# Patient Record
Sex: FEMALE | Race: BLACK OR AFRICAN AMERICAN | Employment: UNEMPLOYED | URBAN - METROPOLITAN AREA
[De-identification: names, ages, dates, MRNs, and addresses within clinical notes are randomized per-mention and may not be internally consistent; named-entity substitution may affect disease eponyms.]

---

## 2021-05-04 ENCOUNTER — HOSPITAL ENCOUNTER (OUTPATIENT)
Dept: LAB | Age: 56
Discharge: HOME | End: 2021-05-04
Payer: COMMERCIAL

## 2021-05-04 ENCOUNTER — OFFICE VISIT (OUTPATIENT)
Dept: FAMILY MEDICINE CLINIC | Age: 56
End: 2021-05-04
Payer: COMMERCIAL

## 2021-05-04 VITALS
DIASTOLIC BLOOD PRESSURE: 79 MMHG | BODY MASS INDEX: 28.94 KG/M2 | HEIGHT: 67 IN | OXYGEN SATURATION: 100 % | HEART RATE: 63 BPM | WEIGHT: 184.4 LBS | SYSTOLIC BLOOD PRESSURE: 116 MMHG | TEMPERATURE: 98.3 F | RESPIRATION RATE: 16 BRPM

## 2021-05-04 DIAGNOSIS — R07.9 CHEST PAIN, UNSPECIFIED TYPE: ICD-10-CM

## 2021-05-04 DIAGNOSIS — R94.31 ABNORMAL FINDING ON EKG: ICD-10-CM

## 2021-05-04 DIAGNOSIS — Z87.19 HISTORY OF DIVERTICULITIS: ICD-10-CM

## 2021-05-04 DIAGNOSIS — D64.9 ANEMIA, UNSPECIFIED TYPE: ICD-10-CM

## 2021-05-04 DIAGNOSIS — Z00.00 PHYSICAL EXAM: Primary | ICD-10-CM

## 2021-05-04 DIAGNOSIS — E55.9 VITAMIN D DEFICIENCY: ICD-10-CM

## 2021-05-04 DIAGNOSIS — R07.89 CHEST WALL TENDERNESS: ICD-10-CM

## 2021-05-04 DIAGNOSIS — K21.9 GASTROESOPHAGEAL REFLUX DISEASE WITHOUT ESOPHAGITIS: ICD-10-CM

## 2021-05-04 DIAGNOSIS — R53.83 FATIGUE, UNSPECIFIED TYPE: ICD-10-CM

## 2021-05-04 DIAGNOSIS — Z12.11 SCREEN FOR COLON CANCER: ICD-10-CM

## 2021-05-04 DIAGNOSIS — Z00.00 PHYSICAL EXAM: ICD-10-CM

## 2021-05-04 DIAGNOSIS — R29.898 WEAKNESS OF BOTH LEGS: ICD-10-CM

## 2021-05-04 DIAGNOSIS — Z12.31 ENCOUNTER FOR SCREENING MAMMOGRAM FOR MALIGNANT NEOPLASM OF BREAST: ICD-10-CM

## 2021-05-04 LAB
25(OH)D3 SERPL-MCNC: 28.5 NG/ML (ref 30–100)
ALBUMIN SERPL-MCNC: 3.8 G/DL (ref 3.4–5)
ALBUMIN/GLOB SERPL: 1.1 {RATIO} (ref 0.8–1.7)
ALP SERPL-CCNC: 91 U/L (ref 45–117)
ALT SERPL-CCNC: 77 U/L (ref 13–56)
ANION GAP SERPL CALC-SCNC: 4 MMOL/L (ref 3–18)
APPEARANCE UR: CLEAR
AST SERPL-CCNC: 48 U/L (ref 10–38)
BACTERIA URNS QL MICRO: ABNORMAL /HPF
BASOPHILS # BLD: 0.1 K/UL (ref 0–0.1)
BASOPHILS NFR BLD: 2 % (ref 0–2)
BILIRUB SERPL-MCNC: 0.4 MG/DL (ref 0.2–1)
BILIRUB UR QL: NEGATIVE
BUN SERPL-MCNC: 15 MG/DL (ref 7–18)
BUN/CREAT SERPL: 20 (ref 12–20)
CALCIUM SERPL-MCNC: 9.1 MG/DL (ref 8.5–10.1)
CHLORIDE SERPL-SCNC: 109 MMOL/L (ref 100–111)
CHOLEST SERPL-MCNC: 235 MG/DL
CO2 SERPL-SCNC: 28 MMOL/L (ref 21–32)
COLOR UR: YELLOW
CREAT SERPL-MCNC: 0.74 MG/DL (ref 0.6–1.3)
DIFFERENTIAL METHOD BLD: ABNORMAL
EOSINOPHIL # BLD: 0.2 K/UL (ref 0–0.4)
EOSINOPHIL NFR BLD: 4 % (ref 0–5)
EPITH CASTS URNS QL MICRO: ABNORMAL /LPF (ref 0–5)
ERYTHROCYTE [DISTWIDTH] IN BLOOD BY AUTOMATED COUNT: 14.7 % (ref 11.6–14.5)
EST. AVERAGE GLUCOSE BLD GHB EST-MCNC: 114 MG/DL
GLOBULIN SER CALC-MCNC: 3.6 G/DL (ref 2–4)
GLUCOSE SERPL-MCNC: 92 MG/DL (ref 74–99)
GLUCOSE UR STRIP.AUTO-MCNC: NEGATIVE MG/DL
HBA1C MFR BLD: 5.6 % (ref 4.2–5.6)
HCT VFR BLD AUTO: 36.2 % (ref 35–45)
HDLC SERPL-MCNC: 67 MG/DL (ref 40–60)
HDLC SERPL: 3.5 {RATIO} (ref 0–5)
HGB BLD-MCNC: 11.5 G/DL (ref 12–16)
HGB UR QL STRIP: NEGATIVE
KETONES UR QL STRIP.AUTO: NEGATIVE MG/DL
LDLC SERPL CALC-MCNC: 157 MG/DL (ref 0–100)
LEUKOCYTE ESTERASE UR QL STRIP.AUTO: ABNORMAL
LIPID PROFILE,FLP: ABNORMAL
LYMPHOCYTES # BLD: 2.2 K/UL (ref 0.9–3.6)
LYMPHOCYTES NFR BLD: 48 % (ref 21–52)
MCH RBC QN AUTO: 30.3 PG (ref 24–34)
MCHC RBC AUTO-ENTMCNC: 31.8 G/DL (ref 31–37)
MCV RBC AUTO: 95.3 FL (ref 74–97)
MONOCYTES # BLD: 0.8 K/UL (ref 0.05–1.2)
MONOCYTES NFR BLD: 16 % (ref 3–10)
NEUTS SEG # BLD: 1.4 K/UL (ref 1.8–8)
NEUTS SEG NFR BLD: 31 % (ref 40–73)
NITRITE UR QL STRIP.AUTO: NEGATIVE
PH UR STRIP: 5.5 [PH] (ref 5–8)
PLATELET # BLD AUTO: 337 K/UL (ref 135–420)
PMV BLD AUTO: 9.6 FL (ref 9.2–11.8)
POTASSIUM SERPL-SCNC: 4.1 MMOL/L (ref 3.5–5.5)
PROT SERPL-MCNC: 7.4 G/DL (ref 6.4–8.2)
PROT UR STRIP-MCNC: NEGATIVE MG/DL
RBC # BLD AUTO: 3.8 M/UL (ref 4.2–5.3)
RBC #/AREA URNS HPF: NEGATIVE /HPF (ref 0–5)
SODIUM SERPL-SCNC: 141 MMOL/L (ref 136–145)
SP GR UR REFRACTOMETRY: >1.03 (ref 1–1.03)
T4 FREE SERPL-MCNC: 0.9 NG/DL (ref 0.7–1.5)
TRIGL SERPL-MCNC: 55 MG/DL (ref ?–150)
TSH SERPL DL<=0.05 MIU/L-ACNC: 2.21 UIU/ML (ref 0.36–3.74)
UROBILINOGEN UR QL STRIP.AUTO: 0.2 EU/DL (ref 0.2–1)
VLDLC SERPL CALC-MCNC: 11 MG/DL
WBC # BLD AUTO: 4.6 K/UL (ref 4.6–13.2)
WBC URNS QL MICRO: ABNORMAL /HPF (ref 0–5)

## 2021-05-04 PROCEDURE — 82306 VITAMIN D 25 HYDROXY: CPT

## 2021-05-04 PROCEDURE — 84439 ASSAY OF FREE THYROXINE: CPT

## 2021-05-04 PROCEDURE — 85025 COMPLETE CBC W/AUTO DIFF WBC: CPT

## 2021-05-04 PROCEDURE — 80053 COMPREHEN METABOLIC PANEL: CPT

## 2021-05-04 PROCEDURE — 81001 URINALYSIS AUTO W/SCOPE: CPT

## 2021-05-04 PROCEDURE — 99396 PREV VISIT EST AGE 40-64: CPT | Performed by: NURSE PRACTITIONER

## 2021-05-04 PROCEDURE — 36415 COLL VENOUS BLD VENIPUNCTURE: CPT

## 2021-05-04 PROCEDURE — 84443 ASSAY THYROID STIM HORMONE: CPT

## 2021-05-04 PROCEDURE — 83036 HEMOGLOBIN GLYCOSYLATED A1C: CPT

## 2021-05-04 PROCEDURE — 93000 ELECTROCARDIOGRAM COMPLETE: CPT | Performed by: NURSE PRACTITIONER

## 2021-05-04 PROCEDURE — 80061 LIPID PANEL: CPT

## 2021-05-04 RX ORDER — METHOCARBAMOL 500 MG/1
500 TABLET, FILM COATED ORAL 4 TIMES DAILY
COMMUNITY

## 2021-05-04 RX ORDER — HYDROGEN PEROXIDE 3 %
20 SOLUTION, NON-ORAL MISCELLANEOUS 2 TIMES DAILY
Qty: 60 CAP | Refills: 1 | Status: SHIPPED | OUTPATIENT
Start: 2021-05-04 | End: 2021-07-03

## 2021-05-04 RX ORDER — LORATADINE 10 MG/1
10 TABLET ORAL
COMMUNITY

## 2021-05-04 RX ORDER — HYDROGEN PEROXIDE 3 %
SOLUTION, NON-ORAL MISCELLANEOUS DAILY
COMMUNITY
End: 2021-05-04 | Stop reason: SDUPTHER

## 2021-05-04 NOTE — PROGRESS NOTES
Claudette Purcell Gage presents today for   Chief Complaint   Patient presents with    New Patient    Medication Refill    Chest Pain     Center of chest    Peripheral Neuropathy       Claudette Purcell Gage preferred language for health care discussion is english/other. Personal Protective Equipment:   Personal Protective Equipment was used including: mask-surgical and hands-gloves. Patient was placed on no precaution(s). Patient was masked. Precautions:   Patient currently on None  Patient currently roomed with door closed    Is someone accompanying this pt? Yes; Spouse    Is the patient using any DME equipment during OV? Yes; Cane    Depression Screening:  3 most recent PHQ Screens 5/4/2021   Little interest or pleasure in doing things Not at all   Feeling down, depressed, irritable, or hopeless Not at all   Total Score PHQ 2 0       Learning Assessment:  No flowsheet data found. Abuse Screening:  Abuse Screening Questionnaire 5/4/2021   Do you ever feel afraid of your partner? N   Are you in a relationship with someone who physically or mentally threatens you? N   Is it safe for you to go home? Y       Fall Risk  Fall Risk Assessment, last 12 mths 5/4/2021   Able to walk? Yes   Fall in past 12 months? 0   Do you feel unsteady? 0   Are you worried about falling 0       Pt currently taking Anticoagulant therapy? No    Coordination of Care:  1. Have you been to the ER, urgent care clinic since your last visit? Hospitalized since your last visit? N/A    2. Have you seen or consulted any other health care providers outside of the [address] [place] [address] since your last visit? Include any pap smears or colon screening.  N/A

## 2021-05-04 NOTE — PROGRESS NOTES
HISTORY OF PRESENT ILLNESS  Claudette Reford Button is a 60 y.o. female seen for physical exam and labs  HPI   Patient recently relocated from [place]. Patient's  has prostate cancer and receiving care here from Dr. Rell Tyler. 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 Patient reports she was in the [employer] for several years. Patient has a history of bilateral leg weakness and uses a cane. She denies any recent falls. Patient said she had a family member diagnosed with multiple sclerosis and she was checked she only had 1 marker for multiple sclerosis, so it was ruled out. Patient reports she has a history of diverticulitis and was treated with an antibiotic inpatient in the past.  Patient also says she has a history of anemia as a child. Patient reports at times she has chest pain that goes into her left arm. We discussed there is different reasons for chest pain. I was able to reproduce chest pain with palpation. Ordered point-of-care EKG. Showed sinus rhythm, low voltage in precordial leads, left atrial enlargement and negative for precordial T waves. Ordered echo and referral to Dr. Eulalio Thomason with cardiology. Patient has been under a lot of stress due to recent move and she is worried about her 's health. Patient denies any trauma to her chest or lifting heavy objects. Patient has a history of chronic GERD. Patient says she has been taking Nexium 20 mg daily. She reports that she still has symptoms. Ordered Nexium 20 mg twice daily. Ordered referral to GI. Patient reports she is on the 5-year plan for colonoscopies. Ordered fit test.    Patient likely due for Pap smear, she will think about scheduling with our office. Discussed using a GYN provider as well. Patient reports her appetite is okay, no urinary issues and regular bowel movements. Patient denies fever, chills, shortness of breath, abdomen pain or dark tarry stool.     Recent labs:  Patient's A1c is 5.6, she is close to being prediabetic at 5.7. Vitamin D is low at 28.8. Recommend vitamin D 3, 1000 units daily. Patient's urinalysis was slightly abnormal with a small amount of bacteria and 4-10 white blood cells. Patient did not complain of UTI symptoms. Patient's cholesterol is high at 235, normal triglycerides of 55 and high LDL at 157. Patient's a good cholesterol, HDL is heart protective at 67. We should discuss lifestyle changes related to cholesterol and could consider a cholesterol-lowering medication. Patient's liver enzymes are slightly elevated. TSH related to thyroid is normal.  Patient is slightly anemic. Patient reports she is on the 5-year plan as far as colonoscopy. I will order a fit test.    Review of Systems   Constitutional: Positive for weight loss. Negative for chills, fever and malaise/fatigue. HENT: Negative. Eyes: Negative. Respiratory: Negative. Cardiovascular: Positive for chest pain. Negative for palpitations, orthopnea, claudication, leg swelling and PND. Patient reports she has some left-sided chest pain. She has a history of GERD. Gastrointestinal: Positive for heartburn. Patient reports she has a history of colon polyps, she is on a 5-year plan related colonoscopy. Patient has a history of GERD. Genitourinary: Negative. Musculoskeletal: Negative for back pain, falls, joint pain, myalgias and neck pain. Skin: Negative. Neurological: Positive for weakness. Negative for dizziness, tingling, tremors, sensory change, speech change, focal weakness, seizures, loss of consciousness and headaches. Patient says she has a year history of bilateral leg weakness   Endo/Heme/Allergies: Negative. Psychiatric/Behavioral: Negative for depression. The patient is not nervous/anxious.       Visit Vitals  /79 (BP 1 Location: Left upper arm, BP Patient Position: Sitting, BP Cuff Size: Adult)   Pulse 63   Temp 98.3 °F (36.8 °C) (Temporal)   Resp 16   Ht 5' 7\" (1.702 m)   Wt 184 lb 6.4 oz (83.6 kg)   SpO2 100%   BMI 28.88 kg/m²     Physical Exam  Constitutional:       General: She is not in acute distress. Appearance: Normal appearance. She is not ill-appearing. HENT:      Head: Normocephalic and atraumatic. Right Ear: Tympanic membrane, ear canal and external ear normal. There is no impacted cerumen. Left Ear: Tympanic membrane, ear canal and external ear normal. There is no impacted cerumen. Nose: Nose normal. No congestion. Mouth/Throat:      Mouth: Mucous membranes are moist.      Pharynx: Oropharynx is clear. No oropharyngeal exudate. Eyes:      General:         Right eye: No discharge. Left eye: No discharge. Extraocular Movements: Extraocular movements intact. Conjunctiva/sclera: Conjunctivae normal.      Pupils: Pupils are equal, round, and reactive to light. Neck:      Musculoskeletal: Normal range of motion and neck supple. No muscular tenderness. Vascular: No carotid bruit. Cardiovascular:      Rate and Rhythm: Normal rate and regular rhythm. Pulses: Normal pulses. Heart sounds: Normal heart sounds. No murmur. No friction rub. No gallop. Pulmonary:      Effort: Pulmonary effort is normal.      Breath sounds: Normal breath sounds. Comments: Able to reproduce chest pain on palpation  Chest:      Chest wall: Tenderness present. Abdominal:      General: Bowel sounds are normal.      Palpations: Abdomen is soft. Musculoskeletal: Normal range of motion. Right lower leg: No edema. Left lower leg: No edema. Comments: Right hip tenderness on palpation   Lymphadenopathy:      Cervical: No cervical adenopathy. Skin:     General: Skin is warm and dry. Coloration: Skin is not jaundiced or pale. Neurological:      Mental Status: She is alert and oriented to person, place, and time.    Psychiatric:         Mood and Affect: Mood normal.         Behavior: Behavior normal.         Thought Content: Thought content normal.         Judgment: Judgment normal.     ASSESSMENT and PLAN  Diagnoses and all orders for this visit:    1. Physical exam  -     LIPID PANEL; Future  -     METABOLIC PANEL, COMPREHENSIVE; Future  -     TSH 3RD GENERATION; Future  -     T4, FREE; Future  -     URINALYSIS W/ RFLX MICROSCOPIC; Future  -     CBC WITH AUTOMATED DIFF; Future  -     HEMOGLOBIN A1C WITH EAG; Future  -     [facility] 3D TAHIR W MAMMO BI SCREENING INCL CAD; Future  -     REFERRAL TO GASTROENTEROLOGY  -     AMB POC EKG ROUTINE W/ 12 LEADS, INTER & REP    2. Encounter for screening mammogram for malignant neoplasm of breast  -     [facility] 3D TAHIR W MAMMO BI SCREENING INCL CAD; Future    3. History of diverticulitis  -     REFERRAL TO GASTROENTEROLOGY    4. Chest pain, unspecified type  -     AMB POC EKG ROUTINE W/ 12 LEADS, INTER & REP  -     REFERRAL TO CARDIOLOGY  -     ECHO ADULT COMPLETE; Future    5. Gastroesophageal reflux disease without esophagitis  -     REFERRAL TO GASTROENTEROLOGY  -     esomeprazole (NexIUM) 20 mg capsule; Take 1 Cap by mouth two (2) times a day for 60 days. 6. Vitamin D deficiency  -     VITAMIN D, 25 HYDROXY; Future    7. Weakness of both legs  -     REFERRAL TO NEUROLOGY    8. Abnormal finding on EKG  -     REFERRAL TO CARDIOLOGY    9. Chest wall tenderness    10. Fatigue, unspecified type    11. Anemia, unspecified type  -     OCCULT BLOOD IMMUNOASSAY,DIAGNOSTIC; Future    12. Screen for colon cancer  -     OCCULT BLOOD IMMUNOASSAY,DIAGNOSTIC; Future      Follow-up and Dispositions    · Return in about 2 weeks (around 5/18/2021). 50% of 30 minutes spent on counseling and managing her care. Return in 2 weeks. Advised patient if she were to experience chest pain, sweating and or shortness of breath to respond to emergency room. Patient and I discussed chest pain related to GERD and costochondritis.

## 2021-05-05 NOTE — PROGRESS NOTES
Patient's A1c is 5.6, she is close to being prediabetic at 5.7. Vitamin D is low at 28.8. Recommend vitamin D 3, 1000 units daily. Patient's urinalysis was slightly abnormal with a small amount of bacteria and 4-10 white blood cells. Patient did not complain of UTI symptoms. Patient's cholesterol is high at 235, normal triglycerides of 55 and high LDL at 157. Patient's a good cholesterol, HDL is heart protective at 67. We should discuss lifestyle changes related to cholesterol and could consider a cholesterol-lowering medication. Patient's liver enzymes are slightly elevated. TSH related to thyroid is normal.  Patient is slightly anemic. Patient reports she is on the 5-year plan as far as colonoscopy.   I will order a fit test.

## 2021-05-05 NOTE — PROGRESS NOTES
EKG May 4, 2021: Sinus  Rhythm. Low voltage in precordial leads. Left atrial enlargement. Negative precordial T-waves. Patient had a normal EKG May 10, 2010.

## 2021-05-06 ENCOUNTER — HOSPITAL ENCOUNTER (OUTPATIENT)
Dept: WOMENS IMAGING | Age: 56
Discharge: HOME | End: 2021-05-06
Attending: NURSE PRACTITIONER
Payer: COMMERCIAL

## 2021-05-06 DIAGNOSIS — Z12.31 ENCOUNTER FOR SCREENING MAMMOGRAM FOR MALIGNANT NEOPLASM OF BREAST: ICD-10-CM

## 2021-05-06 DIAGNOSIS — Z00.00 PHYSICAL EXAM: ICD-10-CM

## 2021-05-06 PROCEDURE — 77063 BREAST TOMOSYNTHESIS BI: CPT

## 2021-05-16 DIAGNOSIS — N63.10 BREAST MASS, RIGHT: Primary | ICD-10-CM

## 2021-05-16 NOTE — PROGRESS NOTES
Please call patient. FINDINGS:      5 mm circumscribed mass demonstrated best on CC 3-D image 10/55 located 6 cm  deep to the nipple at the 6:00 position. There are no suspicious masses, regions of distortion, nor suspicious  microcalcifications on the left.   IMPRESSION   5 mm circumscribed mass 6:00 position 6 cm deep to the nipple right breast.    Birads : BIRADS 0:  Incomplete: Need additional imaging evaluation.   Management Recommendation: Ultrasound right breast  I ordered ultrasound of right breast.

## 2021-05-18 NOTE — PROGRESS NOTES
Contacted patient and left message to contact the office when available. Awaiting callback. Will confirm patient has scheduled with radiology for additional imaging.

## 2021-05-19 NOTE — PROGRESS NOTES
Message from radiology Dr. Tesha Snyder states patient's outside mammograms dated 2019 and 2018 are now available for comparison. The 5 mm mass right breast is unchanged and consistent with benign etiology.

## 2021-05-20 ENCOUNTER — OFFICE VISIT (OUTPATIENT)
Dept: CARDIOLOGY CLINIC | Age: 56
End: 2021-05-20
Payer: COMMERCIAL

## 2021-05-20 VITALS
DIASTOLIC BLOOD PRESSURE: 74 MMHG | TEMPERATURE: 97.3 F | SYSTOLIC BLOOD PRESSURE: 109 MMHG | OXYGEN SATURATION: 99 % | WEIGHT: 183 LBS | BODY MASS INDEX: 28.66 KG/M2 | HEART RATE: 75 BPM

## 2021-05-20 DIAGNOSIS — R60.9 EDEMA, UNSPECIFIED TYPE: ICD-10-CM

## 2021-05-20 DIAGNOSIS — R06.09 DOE (DYSPNEA ON EXERTION): Primary | ICD-10-CM

## 2021-05-20 PROCEDURE — 99204 OFFICE O/P NEW MOD 45 MIN: CPT | Performed by: INTERNAL MEDICINE

## 2021-05-20 NOTE — LETTER
5/20/2021 Patient: Refugio Aguirre YOB: 1965 Date of Visit: 5/20/2021 Marcela Noe NP 
[id number] [id number] [address] [employer] Dear Marcela Noe NP, Thank you for referring Ms. Claudette Onwularu to [facility] for evaluation. My notes for this consultation are attached. If you have questions, please do not hesitate to call me. I look forward to following your patient along with you. Sincerely, Anjali Batista MD

## 2021-05-20 NOTE — PATIENT INSTRUCTIONS
Testing Echo Event monitor-mailed to your home **call office 3-5 days after testing is completed for results**

## 2021-05-20 NOTE — PROGRESS NOTES
[facility]    Ms. Oliver Aase is 78-year-old female    Patient is here today for initial cardiac evaluation. She denies any prior history of MI or CHF. Patient was sent to me for the evaluation of possible cardiac problem because of ongoing dyspnea. According to patient, she has been having worsening dyspnea on exertion for last 7 to 8 months. It is getting worse to the point that even day-to-day activity sometimes makes her short of breath. She uses 12 pillows at night. She has been feeling fatigue and tired. She does not report any symptom that is concerning for angina. She is planning nonpitting edema. She has neuropathy of lower extremity not from diabetes for which she is using cane to avoid instability  She denies recent presyncope or syncope. However as a teenager, patient used to have a frequent syncope which stopped after eating 20. She occasionally has been having palpitation for last several months lasting for less than 5 minutes and sometimes associated with dizziness. Denies any nausea, vomiting, abdominal pain, fever, chills, sputum production. No hematuria or other bleeding complaints    No past medical history on file. Review of Systems:  Cardiac symptoms as noted above in HPI. All others negative. Current Outpatient Medications   Medication Sig    esomeprazole (NexIUM) 20 mg capsule Take 1 Cap by mouth two (2) times a day for 60 days.  methocarbamoL (Robaxin) 500 mg tablet Take 500 mg by mouth four (4) times daily.  loratadine (Claritin) 10 mg tablet Take 10 mg by mouth.  codeine-guaifenesin (ROBITUSSIN-AC)  mg/5 mL syrup Take 5 mL by mouth three (3) times daily as needed for Cough. No current facility-administered medications for this visit.        Past Surgical History:   Procedure Laterality Date    HX HERNIA REPAIR      13 y/o    HX TONSILLECTOMY      13 y/o       Allergies and Sensitivities:  Allergies   Allergen Reactions    Latex Contact Dermatitis       Family History:  No family history on file. Social History:  Social History     Tobacco Use    Smoking status: Never Smoker    Smokeless tobacco: Never Used   Substance Use Topics    Alcohol use: Yes     Comment: occ    Drug use: Not on file     She  reports that she has never smoked. She has never used smokeless tobacco.  She  reports current alcohol use. Physical Exam:  BP Readings from Last 3 Encounters:   05/20/21 109/74   05/04/21 116/79   07/27/10 120/90         Pulse Readings from Last 3 Encounters:   05/20/21 75   05/04/21 63   07/27/10 80          Wt Readings from Last 3 Encounters:   05/20/21 83 kg (183 lb)   05/04/21 83.6 kg (184 lb 6.4 oz)   05/06/10 84.1 kg (185 lb 8 oz)       Constitutional: Oriented to person, place, and time. HENT: Head: Normocephalic and atraumatic. Eyes: Conjunctivae and extraocular motions are normal.   Neck: No JVD present. Carotid bruit is not appreciated. Cardiovascular: Regular rhythm. No murmur, gallop or rubs appreciated  Lung: Breath sounds normal. No respiratory distress. No ronchi or rales appreciated  Abdominal: No tenderness. No rebound and no guarding. Musculoskeletal: There is no pitting lower extremity edema. No cynosis  Lymphadenopathy:  No cervical or supraclavicular adenopathy appriciated. Neurological: No gross motor deficit noted. Skin: No visible skin rash noted. No Ear discharge noted  Psychiatric: Normal mood and affect.      LABS:   @  Lab Results   Component Value Date/Time    WBC 4.6 05/04/2021 10:37 AM    HGB 11.5 (L) 05/04/2021 10:37 AM    HCT 36.2 05/04/2021 10:37 AM    PLATELET 700 07/66/5663 10:37 AM    MCV 95.3 05/04/2021 10:37 AM     Lab Results   Component Value Date/Time    Sodium 141 05/04/2021 10:37 AM    Potassium 4.1 05/04/2021 10:37 AM    Chloride 109 05/04/2021 10:37 AM    CO2 28 05/04/2021 10:37 AM    Glucose 92 05/04/2021 10:37 AM    BUN 15 05/04/2021 10:37 AM    Creatinine 0.74 05/04/2021 10:37 AM     Lipids Latest Ref Rng & Units 5/4/2021   Chol, Total <200 MG/(H)   HDL 40 - 60 MG/DL 67(H)   LDL 0 - 100 MG/(H)   Trig <150 MG/DL 55   Chol/HDL Ratio 0 - 5.0   3.5   Some recent data might be hidden     Lab Results   Component Value Date/Time    ALT (SGPT) 77 (H) 05/04/2021 10:37 AM     Lab Results   Component Value Date/Time    Hemoglobin A1c 5.6 05/04/2021 10:37 AM     Lab Results   Component Value Date/Time    TSH 2.21 05/04/2021 10:37 AM     EKG  05/4/21: Sinus rhythm at 63 bpm.  Low voltage EKG. No ST changes of ischemia    ECHO    STRESS TEST (EST, PHARM, NUC, ECHO etc)    CATHETERIZATION    IMPRESSION & PLAN:  60 y.o. female    Dyspnea/fatigue:   No significant fluid overload on exam today. We will order echo to rule out any LV dysfunction with occasional edema of lower extremity  Recent TSH was normal  Echo will help to rule out any pericardial disease especially normal to EKG  If echocardiogram is unremarkable, would consider stress test.  Patient does not report any anginal symptoms so she would like to start with echocardiogram first which is reasonable    Palpitation:  Without any presyncope or syncope. TSH in 05/2021 normal  Echo to rule out structural abnormality of the heart  In place event monitoring for 1 month    Hyperlipidemia:  10-year risk of ASCVD based on risk profile in 05/2021 is 5.6%. Currently no risk enhancing factors present for this patient  Discussed aggressive lifestyle modification and dietary changes and repeating the profile in 36 months. If no improvement or getting worse, will need to consider statin. She understands the plan    Importance of diet and exercise was discussed with patient. This plan was discussed with patient who is in agreement. Thank you for allowing me to participate in patient care. Please feel free to call me if you have any question or concern.      Herman Franklin, MD  Please note: This document has been produced using voice recognition software. Unrecognized errors in transcription may be present.

## 2021-06-10 ENCOUNTER — HOSPITAL ENCOUNTER (OUTPATIENT)
Dept: LAB | Age: 56
Discharge: HOME | End: 2021-06-10
Payer: MEDICAID

## 2021-06-10 PROCEDURE — 82274 ASSAY TEST FOR BLOOD FECAL: CPT

## 2021-06-16 LAB — HEMOCCULT STL QL IA: NEGATIVE

## 2021-10-05 ENCOUNTER — TRANSCRIBE ORDER (OUTPATIENT)
Dept: LAB | Age: 56
End: 2021-10-05

## 2021-10-05 ENCOUNTER — OFFICE VISIT (OUTPATIENT)
Dept: FAMILY MEDICINE CLINIC | Age: 56
End: 2021-10-05
Payer: MEDICAID

## 2021-10-05 ENCOUNTER — HOSPITAL ENCOUNTER (OUTPATIENT)
Dept: LAB | Age: 56
Discharge: HOME | End: 2021-10-05
Payer: MEDICAID

## 2021-10-05 VITALS
WEIGHT: 194 LBS | SYSTOLIC BLOOD PRESSURE: 133 MMHG | OXYGEN SATURATION: 97 % | HEIGHT: 67 IN | TEMPERATURE: 98.1 F | HEART RATE: 60 BPM | RESPIRATION RATE: 16 BRPM | DIASTOLIC BLOOD PRESSURE: 78 MMHG | BODY MASS INDEX: 30.45 KG/M2

## 2021-10-05 DIAGNOSIS — E78.2 MIXED HYPERLIPIDEMIA: ICD-10-CM

## 2021-10-05 DIAGNOSIS — R07.9 CHEST PAIN, UNSPECIFIED TYPE: Primary | ICD-10-CM

## 2021-10-05 DIAGNOSIS — F33.0 MILD EPISODE OF RECURRENT MAJOR DEPRESSIVE DISORDER (HCC): ICD-10-CM

## 2021-10-05 DIAGNOSIS — G89.29 OTHER CHRONIC PAIN: ICD-10-CM

## 2021-10-05 DIAGNOSIS — K21.9 GASTROESOPHAGEAL REFLUX DISEASE WITHOUT ESOPHAGITIS: ICD-10-CM

## 2021-10-05 DIAGNOSIS — Z00.00 PREVENTATIVE HEALTH CARE: ICD-10-CM

## 2021-10-05 DIAGNOSIS — M25.50 ARTHRALGIA, UNSPECIFIED JOINT: ICD-10-CM

## 2021-10-05 DIAGNOSIS — Z23 NEEDS FLU SHOT: ICD-10-CM

## 2021-10-05 DIAGNOSIS — R07.9 CHEST PAIN, UNSPECIFIED TYPE: ICD-10-CM

## 2021-10-05 LAB
ATRIAL RATE: 56 BPM
CALCULATED P AXIS, ECG09: 57 DEGREES
CALCULATED R AXIS, ECG10: 34 DEGREES
CALCULATED T AXIS, ECG11: 29 DEGREES
DIAGNOSIS, 93000: NORMAL
P-R INTERVAL, ECG05: 182 MS
Q-T INTERVAL, ECG07: 438 MS
QRS DURATION, ECG06: 82 MS
QTC CALCULATION (BEZET), ECG08: 422 MS
VENTRICULAR RATE, ECG03: 56 BPM

## 2021-10-05 PROCEDURE — 90471 IMMUNIZATION ADMIN: CPT | Performed by: NURSE PRACTITIONER

## 2021-10-05 PROCEDURE — 90686 IIV4 VACC NO PRSV 0.5 ML IM: CPT | Performed by: NURSE PRACTITIONER

## 2021-10-05 PROCEDURE — 99212 OFFICE O/P EST SF 10 MIN: CPT | Performed by: NURSE PRACTITIONER

## 2021-10-05 PROCEDURE — 93005 ELECTROCARDIOGRAM TRACING: CPT

## 2021-10-05 RX ORDER — DULOXETIN HYDROCHLORIDE 30 MG/1
30 CAPSULE, DELAYED RELEASE ORAL 2 TIMES DAILY
Qty: 90 CAPSULE | Refills: 2 | Status: SHIPPED | OUTPATIENT
Start: 2021-10-05

## 2021-10-05 RX ORDER — ESOMEPRAZOLE MAGNESIUM 40 MG/1
40 CAPSULE, DELAYED RELEASE ORAL DAILY
Qty: 90 CAPSULE | Refills: 1 | Status: SHIPPED | OUTPATIENT
Start: 2021-10-05

## 2021-10-05 RX ORDER — DULOXETIN HYDROCHLORIDE 30 MG/1
30 CAPSULE, DELAYED RELEASE ORAL 2 TIMES DAILY
Qty: 90 CAPSULE | Refills: 2 | Status: SHIPPED | OUTPATIENT
Start: 2021-10-05 | End: 2021-10-05 | Stop reason: SDUPTHER

## 2021-10-05 RX ORDER — ESOMEPRAZOLE MAGNESIUM 40 MG/1
40 CAPSULE, DELAYED RELEASE ORAL DAILY
Qty: 90 CAPSULE | Refills: 1 | Status: SHIPPED | OUTPATIENT
Start: 2021-10-05 | End: 2021-10-05 | Stop reason: SDUPTHER

## 2021-10-05 RX ORDER — ESOMEPRAZOLE MAGNESIUM 20 MG/1
20 FOR SUSPENSION ORAL 2 TIMES DAILY
Qty: 60 PACKET | Refills: 3 | Status: SHIPPED | OUTPATIENT
Start: 2021-10-05 | End: 2021-10-05 | Stop reason: CLARIF

## 2021-10-05 NOTE — PROGRESS NOTES
Irma Michael presents today for   Chief Complaint   Patient presents with    GERD    Neck Pain       Is someone accompanying this pt? no    Is the patient using any DME equipment during OV? Yes a cane    Depression Screening:  3 most recent PHQ Screens 5/20/2021   Little interest or pleasure in doing things Not at all   Feeling down, depressed, irritable, or hopeless Not at all   Total Score PHQ 2 0       Learning Assessment:  No flowsheet data found. Abuse Screening:  Abuse Screening Questionnaire 5/4/2021   Do you ever feel afraid of your partner? N   Are you in a relationship with someone who physically or mentally threatens you? N   Is it safe for you to go home? Y       Fall Risk  Fall Risk Assessment, last 12 mths 5/4/2021   Able to walk? Yes   Fall in past 12 months? 0   Do you feel unsteady? 0   Are you worried about falling 0       Health Maintenance reviewed and discussed and ordered per Provider. Health Maintenance Due   Topic Date Due    Hepatitis C Screening  Never done    COVID-19 Vaccine (1) Never done    DTaP/Tdap/Td series (1 - Tdap) Never done    Cervical cancer screen  Never done    Shingrix Vaccine Age 50> (1 of 2) Never done    Flu Vaccine (1) Never done   . Coordination of Care:  1. Have you been to the ER, urgent care clinic since your last visit? Hospitalized since your last visit? no    2. Have you seen or consulted any other health care providers outside of the [address] [place] [address] since your last visit? Include any pap smears or colon screening. no      Last  Checked na  Last UDS Checked na  Last Pain contract signed: na    Patient presents in office today for routine care.   Patient concerns: gerd, and neck pain

## 2021-10-05 NOTE — LETTER
10/5/2021    Ms. [address]  [place]      Dear [address]:    Please find your most recent results below. Resulted Orders   EKG, 12 LEAD, INITIAL   Result Value Ref Range    Ventricular Rate 56 BPM    Atrial Rate 56 BPM    P-R Interval 182 ms    QRS Duration 82 ms    Q-T Interval 438 ms    QTC Calculation (Bezet) 422 ms    Calculated P Axis 57 degrees    Calculated R Axis 34 degrees    Calculated T Axis 29 degrees    Diagnosis       Sinus bradycardia  Possible Left atrial enlargement  Septal infarct , age undetermined  Abnormal ECG  No previous ECGs available         RECOMMENDATIONS:  Your EKG showed sinus bradycardia because sure heart rate was below 60 and was 56. Possible left atrial enlargement and we saw this on the last EKG. Septal infarct at undetermined age, should be repeated to confirm finding. I will forward your EKG to Dr. Ovidio Pickering.       Please call me if you have any questions: [phone]    Sincerely,      Pancho Verde NP

## 2021-10-05 NOTE — PROGRESS NOTES
OFFICE NOTE    Claudette Erminio Fake is a 60 y.o. female presenting today for office visit. 10/6/2021  11:11 AM    Chief Complaint   Patient presents with    GERD    Neck Pain     HPI:   Patient in office visit. She is emotional today related to trying to find another apartment. Her landlord will give a reference even though she has always paid on time. She says she had chest pain for the last month and left arm pain and she thinks it is due to her stress. She sees Dr. Ovidio Pickering. She has joint pain in several joints and would like a referral to rheumatology. Smokes tobacco, drinks alcohol or illicit drugs marijuana. Sexually active  single children lives    Patient reports appetite is good, no urinary issues, sleeps well and regular bowel movements. Patient denies fever, chills, chest pain, shortness of breath, abdomen pain or dark tarry stool. Covid vaccine, completed   Flu vaccine, today   Shingles  DTaP  PPSV23  Pap smear, she will schedule with    Breast cancer screening, current   Colorectal cancer screening, current     ROS:    · General: negative for - chills, fever, weight changes or malaise  · HEENT: no sore throat, nasal congestion, vision problems or ear problems  · Respiratory: no cough, shortness of breath, or wheezing  · Cardiovascular: positive for chest pain, negative palpitations, or dyspnea on exertion  · Gastrointestinal: no abdominal pain, N/V, change in bowel habits, or black or bloody stools  · Musculoskeletal: no back pain, joint pain, joint stiffness, muscle pain or muscle weakness  · Neurological: no numbness, tingling, headache or dizziness  · Endo:  No polyuria or polydipsia. · : no hematuria, dysuria, frequency, hesitancy, or nocturia.     · Skin: No itching or rash, no open skin, no unusual lesions   · Psychological: negative for - anxiety, depression, sleep disturbances, suicidal or homicidal ideations     PHQ Screening   3 most recent PHQ Screens 5/20/2021   Little interest or pleasure in doing things Not at all   Feeling down, depressed, irritable, or hopeless Not at all   Total Score PHQ 2 0     History  No past medical history on file. Past Surgical History:   Procedure Laterality Date    HX HERNIA REPAIR      13 y/o    HX TONSILLECTOMY      13 y/o     Social History     Socioeconomic History    Marital status:      Spouse name: Not on file    Number of children: Not on file    Years of education: Not on file    Highest education level: Not on file   Occupational History    Not on file   Tobacco Use    Smoking status: Never Smoker    Smokeless tobacco: Never Used   Substance and Sexual Activity    Alcohol use: Yes     Comment: occ    Drug use: Not on file    Sexual activity: Not on file   Other Topics Concern    Not on file   Social History Narrative    Not on file     Social Determinants of Health     Financial Resource Strain:     Difficulty of Paying Living Expenses:    Food Insecurity:     Worried About Running Out of Food in the Last Year:     Ran Out of Food in the Last Year:    Transportation Needs:     Lack of Transportation (Medical):  Lack of Transportation (Non-Medical):    Physical Activity:     Days of Exercise per Week:     Minutes of Exercise per Session:    Stress:     Feeling of Stress :    Social Connections:     Frequency of Communication with Friends and Family:     Frequency of Social Gatherings with Friends and Family:     Attends Mandaen Services:     Active Member of Clubs or Organizations:     Attends Club or Organization Meetings:     Marital Status:    Intimate Partner Violence:     Fear of Current or Ex-Partner:     Emotionally Abused:     Physically Abused:     Sexually Abused:         Allergies   Allergen Reactions    Latex Contact Dermatitis       Current Outpatient Medications   Medication Sig Dispense Refill    nitroglycerin (NITROSTAT) 0.4 mg SL tablet Take 1 Tablet by mouth every five (5) minutes as needed for Chest Pain. Up to 3 doses. 25 Each 1    aspirin delayed-release 81 mg tablet Take 1 Tablet by mouth daily. Indications: treatment to prevent a heart attack, prevention of transient ischemic attack 90 Tablet 3    atorvastatin (LIPITOR) 20 mg tablet Take 1 Tablet by mouth daily. Indications: high cholesterol and high triglycerides 90 Tablet 3    DULoxetine (Cymbalta) 30 mg capsule Take 1 Capsule by mouth two (2) times a day. For neuropathy 90 Capsule 2    esomeprazole (NEXIUM) 40 mg capsule Take 1 Capsule by mouth daily. 90 Capsule 1    methocarbamoL (Robaxin) 500 mg tablet Take 500 mg by mouth four (4) times daily.  loratadine (Claritin) 10 mg tablet Take 10 mg by mouth.  codeine-guaifenesin (ROBITUSSIN-AC)  mg/5 mL syrup Take 5 mL by mouth three (3) times daily as needed for Cough. 100 mL 1       Patient Care Team:  Patient Care Team:  Dl Manning NP as PCP - General (Nurse Practitioner)  Dl Manning NP as PCP - [facility] Empaneled Provider    LABS:  None new to review    RADIOLOGY:  None new to review    Physical Exam  Patient appears well but emotional, she is pleasant, alert, oriented x 3, in no distress. ENT normal.  Neck supple. No adenopathy or thyromegaly. OSCAR. Lungs are clear, good air entry, no wheezes, rhonchi or rales. Cardiovascular, S1 and S2 normal, no murmurs, regular rate and rhythm. No LAD. Chest wall negative for tenderness  Abdomen is soft without tenderness, guarding, mass or organomegaly. /Anorectal, deferred. Muscleskeletal, no swelling, no tenderness, no injury. Extremities show no edema, normal peripheral pulses. Neurological is normal without focal findings. Skin: no concerning lesions. Psych: Emotional and crying. Oriented x 3. Judgement and insight intact.       Vitals:    10/05/21 1100   BP: 133/78   Pulse: 60   Resp: 16   Temp: 98.1 °F (36.7 °C)   TempSrc: Temporal   SpO2: 97%   Weight: 194 lb (88 kg)   Height: 5' 7\" (1.702 m)   PainSc:   0 - No pain     Assessment and Plan    Diagnoses and all orders for this visit:    Chest pain, unspecified type  -     EKG, 12 LEAD, SUBSEQUENT; Future  -     nitroglycerin (NITROSTAT) 0.4 mg SL tablet; Take 1 Tablet by mouth every five (5) minutes as needed for Chest Pain. Up to 3 doses. , Normal, Disp-25 Each, R-1    Gastroesophageal reflux disease without esophagitis  -     Discontinue: Esomeprazole Magnesium; Take 1 Packet by mouth two (2) times a day for 120 days. , Normal, Disp-60 Packet, R-3  -     Discontinue: esomeprazole (NEXIUM) 40 mg capsule; Take 1 Capsule by mouth daily. , Normal, Disp-90 Capsule, R-1  -     esomeprazole (NEXIUM) 40 mg capsule; Take 1 Capsule by mouth daily. , Normal, Disp-90 Capsule, R-1    Other chronic pain  -     Discontinue: DULoxetine (Cymbalta) 30 mg capsule; Take 1 Capsule by mouth two (2) times a day. For neuropathy, Normal, Disp-90 Capsule, R-2  -     DULoxetine (Cymbalta) 30 mg capsule; Take 1 Capsule by mouth two (2) times a day. For neuropathy, Normal, Disp-90 Capsule, R-2    Mild episode of recurrent major depressive disorder (HCC)  -     Discontinue: DULoxetine (Cymbalta) 30 mg capsule; Take 1 Capsule by mouth two (2) times a day. For neuropathy, Normal, Disp-90 Capsule, R-2  -     DULoxetine (Cymbalta) 30 mg capsule; Take 1 Capsule by mouth two (2) times a day. For neuropathy, Normal, Disp-90 Capsule, R-2    Arthralgia, unspecified joint  -     REFERRAL TO RHEUMATOLOGY    Needs flu shot  -     INFLUENZA VIRUS VACCINE, QUADRIVALENT (RIV4), DERIVED FROM RECOMBINANT DNA,HEMAGGLUTININ(HA) PROTEIN ONLY, PF ABX FREE    Preventative health care  -     aspirin delayed-release 81 mg tablet; Take 1 Tablet by mouth daily. Indications: treatment to prevent a heart attack, prevention of transient ischemic attack, No Print, Disp-90 Tablet, R-3    Mixed hyperlipidemia  -     atorvastatin (LIPITOR) 20 mg tablet; Take 1 Tablet by mouth daily. Indications: high cholesterol and high triglycerides, Normal, Disp-90 Tablet, R-3    *Plan of care reviewed with patient. Patient in agreement with plan and expresses understanding. All questions answered and patient encouraged to call or RTO if further questions or concerns. Advised patient to respond to emergency room if she were to have concerning chest pain. Ordered EKG. 50% of 15 minutes spent on counseling and managing her care. Follow-up and Dispositions    · Return in about 1 month (around 11/5/2021), or if symptoms worsen or fail to improve, for return 1 month for chest pain and stress .    Routing History    Follow-up and Disposition History        JOHANNY Glez

## 2021-10-06 RX ORDER — ATORVASTATIN CALCIUM 20 MG/1
20 TABLET, FILM COATED ORAL DAILY
Qty: 90 TABLET | Refills: 3 | Status: SHIPPED | OUTPATIENT
Start: 2021-10-06

## 2021-10-06 RX ORDER — NITROGLYCERIN 0.4 MG/1
0.4 TABLET SUBLINGUAL
Qty: 25 EACH | Refills: 1 | Status: SHIPPED | OUTPATIENT
Start: 2021-10-06

## 2021-10-06 RX ORDER — ASPIRIN 81 MG/1
81 TABLET ORAL DAILY
Qty: 90 TABLET | Refills: 3
Start: 2021-10-06

## 2021-10-07 ENCOUNTER — HOSPITAL ENCOUNTER (OUTPATIENT)
Dept: LAB | Age: 56
Discharge: HOME | End: 2021-10-07
Payer: MEDICAID

## 2021-10-07 LAB
BASOPHILS # BLD: 0.1 K/UL (ref 0–0.1)
BASOPHILS NFR BLD: 1 % (ref 0–2)
DIFFERENTIAL METHOD BLD: ABNORMAL
EOSINOPHIL # BLD: 0.2 K/UL (ref 0–0.4)
EOSINOPHIL NFR BLD: 5 % (ref 0–5)
ERYTHROCYTE [DISTWIDTH] IN BLOOD BY AUTOMATED COUNT: 13.6 % (ref 11.6–14.5)
HCT VFR BLD AUTO: 38.4 % (ref 35–45)
HGB BLD-MCNC: 11.9 G/DL (ref 12–16)
LYMPHOCYTES # BLD: 2.2 K/UL (ref 0.9–3.6)
LYMPHOCYTES NFR BLD: 43 % (ref 21–52)
MCH RBC QN AUTO: 29 PG (ref 24–34)
MCHC RBC AUTO-ENTMCNC: 31 G/DL (ref 31–37)
MCV RBC AUTO: 93.4 FL (ref 78–100)
MONOCYTES # BLD: 0.8 K/UL (ref 0.05–1.2)
MONOCYTES NFR BLD: 16 % (ref 3–10)
NEUTS SEG # BLD: 1.8 K/UL (ref 1.8–8)
NEUTS SEG NFR BLD: 35 % (ref 40–73)
PLATELET # BLD AUTO: 375 K/UL (ref 135–420)
PMV BLD AUTO: 9.3 FL (ref 9.2–11.8)
RBC # BLD AUTO: 4.11 M/UL (ref 4.2–5.3)
WBC # BLD AUTO: 5.1 K/UL (ref 4.6–13.2)

## 2021-10-07 PROCEDURE — 85025 COMPLETE CBC W/AUTO DIFF WBC: CPT

## 2021-10-07 PROCEDURE — 86003 ALLG SPEC IGE CRUDE XTRC EA: CPT

## 2021-10-07 PROCEDURE — 36415 COLL VENOUS BLD VENIPUNCTURE: CPT

## 2021-10-07 PROCEDURE — 82785 ASSAY OF IGE: CPT

## 2021-10-10 LAB
A ALTERNATA IGE QN: <0.1 KU/L
A FUMIGATUS IGE QN: <0.1 KU/L
AMER ROACH IGE QN: <0.1 KU/L
AMER SYCAMORE IGE QN: <0.1 KU/L
BAHIA GRASS IGE QN: 1.68 KU/L
BERMUDA GRASS IGE QN: 0.32 KU/L
BOXELDER IGE QN: <0.1 KU/L
C HERBARUM IGE QN: <0.1 KU/L
CAT DANDER IGG QN: 0.17 KU/L
CLASS DESCRIPTION, 600268: ABNORMAL
COMMON RAGWEED IGE QN: 5.28 KU/L
D FARINAE IGE QN: <0.1 KU/L
D PTERONYSS IGE QN: <0.1 KU/L
DEPRECATED IGE QN: <0.1 KU/L
DOG DANDER IGE QN: <0.1 KU/L
ENGL PLANTAIN IGE QN: <0.1 KU/L
IGE SERPL-ACNC: 40 IU/ML (ref 6–495)
JOHNSON GRASS IGE QN: 0.74 KU/L
M RACEMOSUS IGE QN: <0.1 KU/L
MT JUNIPER IGE QN: <0.1 KU/L
MUGWORT IGE QN: 0.29 KU/L
NETTLE IGE QN: <0.1 KU/L
P NOTATUM IGE QN: <0.1 KU/L
S BOTRYOSUM IGE QN: <0.1 KU/L
SHEEP SORREL IGE QN: <0.1 KU/L
SWEET GUM IGE QN: 0.14 KU/L
TIMOTHY IGE QN: 1.26 KU/L
WHITE BIRCH IGE QN: <0.1 KU/L
WHITE ELM IGG QN: 0.24 KU/L
WHITE HICKORY IGE QN: <0.1 KU/L
WHITE MULBERRY IGE QN: <0.1 KU/L
WHITE OAK IGE QN: <0.1 KU/L

## 2021-10-18 ENCOUNTER — OFFICE VISIT (OUTPATIENT)
Dept: CARDIOLOGY CLINIC | Age: 56
End: 2021-10-18
Payer: MEDICAID

## 2021-10-18 VITALS
HEIGHT: 67 IN | OXYGEN SATURATION: 99 % | SYSTOLIC BLOOD PRESSURE: 116 MMHG | BODY MASS INDEX: 30.45 KG/M2 | DIASTOLIC BLOOD PRESSURE: 76 MMHG | HEART RATE: 99 BPM | WEIGHT: 194 LBS

## 2021-10-18 DIAGNOSIS — R07.9 CHEST PAIN, UNSPECIFIED TYPE: Primary | ICD-10-CM

## 2021-10-18 PROCEDURE — 99214 OFFICE O/P EST MOD 30 MIN: CPT | Performed by: INTERNAL MEDICINE

## 2021-10-18 NOTE — PROGRESS NOTES
[facility]    Ms. Chasidy Leahy is 60 y.o. female    Patient is here today for cardiac evaluation. She denies any prior history of MI or CHF. Since last visit, patient has undergone echocardiogram and event monitor. She denies any significant dyspnea or palpitation. However last couple month, patient has been experiencing some random episode of chest tightness and heaviness. She was seen by PCP and nitroglycerin was provided. She believes that nitroglycerin is providing partial relief. Sometimes this discomfort radiates to the left arm. No specific exertional symptoms  She denies palpitation, presyncope or syncope    Complaints  Past Medical History:   Diagnosis Date    HLD (hyperlipidemia)        Review of Systems:  Cardiac symptoms as noted above in HPI. All others negative. Current Outpatient Medications   Medication Sig    nitroglycerin (NITROSTAT) 0.4 mg SL tablet Take 1 Tablet by mouth every five (5) minutes as needed for Chest Pain. Up to 3 doses.  aspirin delayed-release 81 mg tablet Take 1 Tablet by mouth daily. Indications: treatment to prevent a heart attack, prevention of transient ischemic attack    atorvastatin (LIPITOR) 20 mg tablet Take 1 Tablet by mouth daily. Indications: high cholesterol and high triglycerides    DULoxetine (Cymbalta) 30 mg capsule Take 1 Capsule by mouth two (2) times a day. For neuropathy    esomeprazole (NEXIUM) 40 mg capsule Take 1 Capsule by mouth daily.  methocarbamoL (Robaxin) 500 mg tablet Take 500 mg by mouth four (4) times daily.  loratadine (Claritin) 10 mg tablet Take 10 mg by mouth.  codeine-guaifenesin (ROBITUSSIN-AC)  mg/5 mL syrup Take 5 mL by mouth three (3) times daily as needed for Cough. No current facility-administered medications for this visit.        Past Surgical History:   Procedure Laterality Date    HX HERNIA REPAIR      13 y/o    HX TONSILLECTOMY 13 y/o       Allergies and Sensitivities:  Allergies   Allergen Reactions    Latex Contact Dermatitis       Family History:  No family history on file. Social History:  Social History     Tobacco Use    Smoking status: Never Smoker    Smokeless tobacco: Never Used   Substance Use Topics    Alcohol use: Yes     Comment: occ    Drug use: Not on file     She  reports that she has never smoked. She has never used smokeless tobacco.  She  reports current alcohol use. Physical Exam:  BP Readings from Last 3 Encounters:   10/18/21 116/76   10/05/21 133/78   06/22/21 (!) 121/90         Pulse Readings from Last 3 Encounters:   10/18/21 99   10/05/21 60   05/20/21 75          Wt Readings from Last 3 Encounters:   10/18/21 88 kg (194 lb)   10/05/21 88 kg (194 lb)   06/22/21 83 kg (183 lb)       Constitutional: Oriented to person, place, and time. HENT: Head: Normocephalic and atraumatic  Neck: No JVD present. Cardiovascular: Regular rhythm. No murmur, gallop or rubs appreciated  Lung: Breath sounds normal. No respiratory distress. No ronchi or rales appreciated  Abdominal: No tenderness. No rebound and no guarding. Musculoskeletal: There is no pitting lower extremity edema.  No cynosis    LABS:   @  Lab Results   Component Value Date/Time    WBC 5.1 10/07/2021 12:32 PM    HGB 11.9 (L) 10/07/2021 12:32 PM    HCT 38.4 10/07/2021 12:32 PM    PLATELET 982 17/19/8720 12:32 PM    MCV 93.4 10/07/2021 12:32 PM     Lab Results   Component Value Date/Time    Sodium 141 05/04/2021 10:37 AM    Potassium 4.1 05/04/2021 10:37 AM    Chloride 109 05/04/2021 10:37 AM    CO2 28 05/04/2021 10:37 AM    Glucose 92 05/04/2021 10:37 AM    BUN 15 05/04/2021 10:37 AM    Creatinine 0.74 05/04/2021 10:37 AM     Lipids Latest Ref Rng & Units 5/4/2021   Chol, Total <200 MG/(H)   HDL 40 - 60 MG/DL 67(H)   LDL 0 - 100 MG/(H)   Trig <150 MG/DL 55   Chol/HDL Ratio 0 - 5.0   3.5   Some recent data might be hidden     Lab Results   Component Value Date/Time    ALT (SGPT) 77 (H) 05/04/2021 10:37 AM     Lab Results   Component Value Date/Time    Hemoglobin A1c 5.6 05/04/2021 10:37 AM     Lab Results   Component Value Date/Time    TSH 2.21 05/04/2021 10:37 AM     EKG  05/4/21: Sinus rhythm at 63 bpm.  Low voltage EKG. No ST changes of ischemia    06/22/21    ECHO ADULT COMPLETE 06/22/2021 6/22/2021    Interpretation Summary  · LV: Estimated LVEF is 55 - 60%. Visually measured ejection fraction. Normal cavity size, wall thickness, systolic function (ejection fraction normal) and diastolic function. · LA: Left Atrium volume index is 31.39 mL/m2. · AO: Mild ascending aorta dilatation. Ascending aorta diameter = 3.8 cm. · MV: Mitral valve non-specific thickening. · TV: Mild tricuspid valve regurgitation is present. · PV: Mild to moderate pulmonic valve regurgitation is present. · PA: Pulmonary arterial systolic pressure is 19 mmHg. Event monitor  (09/2021)  Sinus rhythm. No A. fib or pause. Appropriate heart rate variability. No significant ventricular ectopy is noted. CATHETERIZATION    IMPRESSION & PLAN:  60 y.o. female    Chest discomfort:  Since last visit, patient has started having some chest discomfort partially responsive to nitroglycerin. Concerning for angina. We will proceed with nuclear stress test  She will need pharmacological stress test as patient has a significant DJD of back and hip pain prohibiting from treadmill stress test  She is on aspirin and statin  Use nitroglycerin as needed. Advised against exertional activity    Palpitation:  Without any presyncope or syncope. TSH in 05/2021 normal  Event monitor and echocardiogram low risk in 08/2021  Currently without any symptoms    Hyperlipidemia:  10-year risk of ASCVD based on risk profile in 05/2021 is 5.6%.   Currently no risk enhancing factors present for this patient  Discussed aggressive lifestyle modification and dietary changes and repeating the profile in 3-6 months. If no improvement or getting worse, will need to consider statin. She understands the plan    This plan was discussed with patient who is in agreement. Thank you for allowing me to participate in patient care. Please feel free to call me if you have any question or concern. John Vieira MD  Please note: This document has been produced using voice recognition software. Unrecognized errors in transcription may be present.

## 2021-10-18 NOTE — PROGRESS NOTES
[facility]    Ms. Jabier Muro is 53-year-old female    Patient is here today for initial cardiac evaluation. She denies any prior history of MI or CHF. Patient was sent to me for the evaluation of possible cardiac problem because of ongoing dyspnea. According to patient, she has been having worsening dyspnea on exertion for last 7 to 8 months. It is getting worse to the point that even day-to-day activity sometimes makes her short of breath. She uses 1-2 pillows at night. She has been feeling fatigue and tired. She does not report any symptom that is concerning for angina. She is planning nonpitting edema. She has neuropathy of lower extremity not from diabetes for which she is using cane to avoid instability  She denies recent presyncope or syncope. However as a teenager, patient used to have a frequent syncope which stopped after eating 20. She occasionally has been having palpitation for last several months lasting for less than 5 minutes and sometimes associated with dizziness. Denies any nausea, vomiting, abdominal pain, fever, chills, sputum production. No hematuria or other bleeding complaints    No past medical history on file. Review of Systems:  Cardiac symptoms as noted above in HPI. All others negative. Current Outpatient Medications   Medication Sig    nitroglycerin (NITROSTAT) 0.4 mg SL tablet Take 1 Tablet by mouth every five (5) minutes as needed for Chest Pain. Up to 3 doses.  aspirin delayed-release 81 mg tablet Take 1 Tablet by mouth daily. Indications: treatment to prevent a heart attack, prevention of transient ischemic attack    atorvastatin (LIPITOR) 20 mg tablet Take 1 Tablet by mouth daily. Indications: high cholesterol and high triglycerides    DULoxetine (Cymbalta) 30 mg capsule Take 1 Capsule by mouth two (2) times a day.  For neuropathy    esomeprazole (NEXIUM) 40 mg capsule Take 1 Capsule by mouth daily.  methocarbamoL (Robaxin) 500 mg tablet Take 500 mg by mouth four (4) times daily.  loratadine (Claritin) 10 mg tablet Take 10 mg by mouth.  codeine-guaifenesin (ROBITUSSIN-AC)  mg/5 mL syrup Take 5 mL by mouth three (3) times daily as needed for Cough. No current facility-administered medications for this visit. Past Surgical History:   Procedure Laterality Date    HX HERNIA REPAIR      13 y/o    HX TONSILLECTOMY      13 y/o       Allergies and Sensitivities:  Allergies   Allergen Reactions    Latex Contact Dermatitis       Family History:  No family history on file. Social History:  Social History     Tobacco Use    Smoking status: Never Smoker    Smokeless tobacco: Never Used   Substance Use Topics    Alcohol use: Yes     Comment: occ    Drug use: Not on file     She  reports that she has never smoked. She has never used smokeless tobacco.  She  reports current alcohol use. Physical Exam:  BP Readings from Last 3 Encounters:   10/05/21 133/78   06/22/21 (!) 121/90   05/20/21 109/74         Pulse Readings from Last 3 Encounters:   10/05/21 60   05/20/21 75   05/04/21 63          Wt Readings from Last 3 Encounters:   10/05/21 88 kg (194 lb)   06/22/21 83 kg (183 lb)   05/20/21 83 kg (183 lb)       Constitutional: Oriented to person, place, and time. HENT: Head: Normocephalic and atraumatic. Eyes: Conjunctivae and extraocular motions are normal.   Neck: No JVD present. Carotid bruit is not appreciated. Cardiovascular: Regular rhythm. No murmur, gallop or rubs appreciated  Lung: Breath sounds normal. No respiratory distress. No ronchi or rales appreciated  Abdominal: No tenderness. No rebound and no guarding. Musculoskeletal: There is no pitting lower extremity edema. No cynosis  Lymphadenopathy:  No cervical or supraclavicular adenopathy appriciated. Neurological: No gross motor deficit noted. Skin: No visible skin rash noted.    No Ear discharge noted  Psychiatric: Normal mood and affect. LABS:   @  Lab Results   Component Value Date/Time    WBC 5.1 10/07/2021 12:32 PM    HGB 11.9 (L) 10/07/2021 12:32 PM    HCT 38.4 10/07/2021 12:32 PM    PLATELET 632 90/14/1699 12:32 PM    MCV 93.4 10/07/2021 12:32 PM     Lab Results   Component Value Date/Time    Sodium 141 05/04/2021 10:37 AM    Potassium 4.1 05/04/2021 10:37 AM    Chloride 109 05/04/2021 10:37 AM    CO2 28 05/04/2021 10:37 AM    Glucose 92 05/04/2021 10:37 AM    BUN 15 05/04/2021 10:37 AM    Creatinine 0.74 05/04/2021 10:37 AM     Lipids Latest Ref Rng & Units 5/4/2021   Chol, Total <200 MG/(H)   HDL 40 - 60 MG/DL 67(H)   LDL 0 - 100 MG/(H)   Trig <150 MG/DL 55   Chol/HDL Ratio 0 - 5.0   3.5   Some recent data might be hidden     Lab Results   Component Value Date/Time    ALT (SGPT) 77 (H) 05/04/2021 10:37 AM     Lab Results   Component Value Date/Time    Hemoglobin A1c 5.6 05/04/2021 10:37 AM     Lab Results   Component Value Date/Time    TSH 2.21 05/04/2021 10:37 AM     EKG  05/4/21: Sinus rhythm at 63 bpm.  Low voltage EKG. No ST changes of ischemia    ECHO (2021)  Left Ventricle Normal cavity size, wall thickness, systolic function (ejection fraction normal) and diastolic function. The estimated EF is 55 - 60%. Visually measured ejection fraction. Wall Scoring The left ventricular wall motion is normal.            Left Atrium Normal cavity size. Left Atrium volume index is 31.39 mL/m2. Right Ventricle Normal cavity size and global systolic function. Right Atrium Normal cavity size. Aortic Valve Trileaflet valve structure and no stenosis. Trace aortic valve regurgitation. Mitral Valve No stenosis. Mitral valve non-specific thickening. Trace regurgitation. Tricuspid Valve Normal valve structure and no stenosis. Mild regurgitation. Pulmonic Valve Normal valve structure and no stenosis. Mild to moderate regurgitation. Aorta Normal aortic root.  Mildly dilated ascending aorta; diameter is 3.8 cm. STRESS TEST (EST, PHARM, NUC, ECHO etc)    CATHETERIZATION    IMPRESSION & PLAN:  60 y.o. female    Dyspnea/fatigue:   No significant fluid overload on exam today. We will order echo to rule out any LV dysfunction with occasional edema of lower extremity  Recent TSH was normal  Echo will help to rule out any pericardial disease especially normal to EKG  If echocardiogram is unremarkable, would consider stress test.  Patient does not report any anginal symptoms so she would like to start with echocardiogram first which is reasonable    Palpitation:  Without any presyncope or syncope. TSH in 05/2021 normal  Echo to rule out structural abnormality of the heart  In place event monitoring for 1 month    Hyperlipidemia:  10-year risk of ASCVD based on risk profile in 05/2021 is 5.6%. Currently no risk enhancing factors present for this patient  Discussed aggressive lifestyle modification and dietary changes and repeating the profile in 3-6 months. If no improvement or getting worse, will need to consider statin. She understands the plan    Importance of diet and exercise was discussed with patient. This plan was discussed with patient who is in agreement. Thank you for allowing me to participate in patient care. Please feel free to call me if you have any question or concern. Rachel Agudelo MD  Please note: This document has been produced using voice recognition software. Unrecognized errors in transcription may be present.

## 2021-10-18 NOTE — LETTER
10/18/2021    Patient: Rene Calzada   YOB: 1965   Date of Visit: 10/18/2021     Renetta Roberts NP  [address] [id number]  [place] [place]    Dear Renetta Roberts NP,      Thank you for referring Ms. Claudette Onwularu to [address] [place] [address] for evaluation. My notes for this consultation are attached. If you have questions, please do not hesitate to call me. I look forward to following your patient along with you.       Sincerely,    Michael Carey MD

## 2021-11-05 ENCOUNTER — TELEPHONE (OUTPATIENT)
Dept: CARDIOLOGY CLINIC | Age: 56
End: 2021-11-05

## 2021-11-08 ENCOUNTER — TELEPHONE (OUTPATIENT)
Dept: CARDIOLOGY CLINIC | Age: 56
End: 2021-11-08

## 2021-11-08 NOTE — TELEPHONE ENCOUNTER
Left message to inform patient that her insurance is denying to approve her nuc stress test. I will get with Dr. Michelle Grimes to see if he wants to order a different test.

## 2022-11-22 DIAGNOSIS — E28.319 ASYMPTOMATIC PREMATURE MENOPAUSE: Primary | ICD-10-CM

## 2022-11-29 ENCOUNTER — HOSPITAL ENCOUNTER (OUTPATIENT)
Dept: RADIOLOGY | Facility: HOSPITAL | Age: 57
Discharge: HOME | End: 2022-11-29
Attending: NURSE PRACTITIONER
Payer: MEDICAID

## 2022-11-29 DIAGNOSIS — E28.319 ASYMPTOMATIC PREMATURE MENOPAUSE: ICD-10-CM

## 2022-11-29 PROCEDURE — 77080 DXA BONE DENSITY AXIAL: CPT | Mod: TC,PO

## 2022-12-02 DIAGNOSIS — M41.9 SCOLIOSIS, UNSPECIFIED: Primary | ICD-10-CM

## 2022-12-02 DIAGNOSIS — Z12.31 ENCOUNTER FOR SCREENING MAMMOGRAM FOR MALIGNANT NEOPLASM OF BREAST: Primary | ICD-10-CM

## 2022-12-21 ENCOUNTER — HOSPITAL ENCOUNTER (OUTPATIENT)
Dept: RADIOLOGY | Facility: HOSPITAL | Age: 57
Discharge: HOME | End: 2022-12-21
Attending: NURSE PRACTITIONER
Payer: MEDICAID

## 2022-12-21 DIAGNOSIS — N64.4 MASTODYNIA: Primary | ICD-10-CM

## 2022-12-21 DIAGNOSIS — M41.9 SCOLIOSIS, UNSPECIFIED: ICD-10-CM

## 2022-12-21 DIAGNOSIS — Z12.31 ENCOUNTER FOR SCREENING MAMMOGRAM FOR MALIGNANT NEOPLASM OF BREAST: ICD-10-CM

## 2022-12-21 PROCEDURE — 72083 X-RAY EXAM ENTIRE SPI 4/5 VW: CPT | Mod: TC,PO

## 2023-01-25 ENCOUNTER — HOSPITAL ENCOUNTER (OUTPATIENT)
Dept: RADIOLOGY | Facility: HOSPITAL | Age: 58
Discharge: HOME | End: 2023-01-25
Attending: NURSE PRACTITIONER
Payer: OTHER GOVERNMENT

## 2023-01-25 DIAGNOSIS — N64.4 MASTODYNIA: ICD-10-CM

## 2023-01-25 PROCEDURE — 77062 BREAST TOMOSYNTHESIS BI: CPT | Mod: TC,PO

## 2023-01-31 DIAGNOSIS — Z12.31 ENCOUNTER FOR SCREENING MAMMOGRAM FOR BREAST CANCER: Primary | ICD-10-CM

## 2023-02-04 DIAGNOSIS — Z12.31 ENCOUNTER FOR SCREENING MAMMOGRAM FOR BREAST CANCER: Primary | ICD-10-CM

## 2023-02-06 DIAGNOSIS — Z12.31 ENCOUNTER FOR SCREENING MAMMOGRAM FOR BREAST CANCER: Primary | ICD-10-CM
